# Patient Record
Sex: MALE | Race: WHITE | NOT HISPANIC OR LATINO | URBAN - METROPOLITAN AREA
[De-identification: names, ages, dates, MRNs, and addresses within clinical notes are randomized per-mention and may not be internally consistent; named-entity substitution may affect disease eponyms.]

---

## 2017-08-17 ENCOUNTER — OFFICE VISIT - HEALTHEAST (OUTPATIENT)
Dept: FAMILY MEDICINE | Facility: CLINIC | Age: 52
End: 2017-08-17

## 2017-08-17 DIAGNOSIS — Z76.89 ENCOUNTER TO ESTABLISH CARE: ICD-10-CM

## 2017-08-17 DIAGNOSIS — Z23 NEED FOR VACCINATION: ICD-10-CM

## 2017-08-17 RX ORDER — LORATADINE 10 MG/1
10 TABLET ORAL DAILY
Status: SHIPPED | COMMUNITY
Start: 2017-08-17

## 2017-08-17 ASSESSMENT — MIFFLIN-ST. JEOR: SCORE: 1569.72

## 2017-10-09 ENCOUNTER — COMMUNICATION - HEALTHEAST (OUTPATIENT)
Dept: FAMILY MEDICINE | Facility: CLINIC | Age: 52
End: 2017-10-09

## 2018-03-28 ENCOUNTER — COMMUNICATION - HEALTHEAST (OUTPATIENT)
Dept: FAMILY MEDICINE | Facility: CLINIC | Age: 53
End: 2018-03-28

## 2021-05-31 VITALS — BODY MASS INDEX: 24.47 KG/M2 | HEIGHT: 69 IN | WEIGHT: 165.2 LBS

## 2021-06-12 NOTE — PROGRESS NOTES
Office Visit - New Patient   Jayson Rosas   52 y.o.  male    Date of visit: 8/17/2017  Physician: Majo Mahomod CNP     Assessment and Plan   1. Encounter to establish care  2. Need for vaccination  Complete needed vaccination  - Tdap vaccine,  6yo or older,  IM    3. Elevated blood pressure  Patient blood pressure was elevated during this visit visit.  Blood pressure was checked twice and was still elevated.  Instruction was given to patient to check his blood pressure twice daily for the next 7 days and return with blood pressure reading.  Patient was also instructed to increase aerobic exercise, reduce sodium diet, and reduce processed food.    No Follow-up on file.     Chief Complaint   Chief Complaint   Patient presents with     Establish Care     pt needs tdap        Patient Profile   Social History     Social History Narrative     No narrative on file        Past Medical History   Patient Active Problem List   Diagnosis     Tympanic Membrane Perforation Of The Left Ear     Acute Upper Respiratory Infection       Past Surgical History  He has no past surgical history on file.     History of Present Illness   This 52 y.o. old male patient with no known medical history presented to the clinic today to establish care and to have his Tdap vaccine.  Patient stated that their only daughter is having a baby tomorrow and he needs to update his Tdap vaccine.  Patient stated that she has no other concerns today.    Review of Systems: A 12 point comprehensive review of systems was negative except as noted.     Medications and Allergies   Current Outpatient Prescriptions   Medication Sig Dispense Refill     loratadine (CLARITIN) 10 mg tablet Take 10 mg by mouth daily.       No current facility-administered medications for this visit.      No Known Allergies     Family and Social History   Family History   Problem Relation Age of Onset     Pneumonia Mother      Cancer Mother      Hypertension Sister       "Hypertension Brother      Diabetes type I Daughter         Social History   Substance Use Topics     Smoking status: Light Tobacco Smoker     Packs/day: 0.25     Smokeless tobacco: Never Used      Comment: Working on quiting by the end of this year, 2017     Alcohol use Yes      Comment: minor        Physical Exam   General Appearance: Well-groomed    BP (!) 150/92  Pulse 80  Ht 5' 9\" (1.753 m)  Wt 165 lb 3.2 oz (74.9 kg)  SpO2 97%  BMI 24.4 kg/m2    EYES: Eyelids, conjunctiva, and sclera were normal. Pupils were normal. Cornea, iris, and lens were normal bilaterally.  HEAD, EARS, NOSE, MOUTH, AND THROAT: Head and face were normal. Hearing was normal to voice and the ears were normal to external exam. Nose appearance was normal and there was no discharge. Oropharynx was normal.  NECK: Neck appearance was normal. There were no neck masses and the thyroid was not enlarged.  RESPIRATORY: Breathing pattern was normal and the chest moved symmetrically.  Percussion/auscultatory percussion was normal.  Lung sounds were normal and there were no abnormal sounds.  CARDIOVASCULAR: Heart rate and rhythm were normal.  S1 and S2 were normal and there were no extra sounds or murmurs. Peripheral pulses in arms and legs were normal.  Jugular venous pressure was normal.  There was no peripheral edema.  MUSCULOSKELETAL: Skeletal configuration was normal and muscle mass was normal for age. Joint appearance was overall normal.  LYMPHATIC: There were no enlarged nodes.  SKIN/HAIR/NAILS: Skin color was normal.  There were no skin lesions.  Hair and nails were normal.  NEUROLOGIC: The patient was alert and oriented to person, place, time, and circumstance. Speech was normal. Cranial nerves were normal. Motor strength was normal for age. The patient was normally coordinated.  PSYCHIATRIC:  Mood and affect were normal and the patient had normal recent and remote memory. The patient's judgment and insight were normal.       Additional " Information     Reviewed patient's immunization record and confirmed need for Tdap and colonoscopy.       Majo Mahmood CNP  Family Nurse Practitioner  [facility]  [phone]  [email or web address]